# Patient Record
Sex: FEMALE | Race: WHITE | NOT HISPANIC OR LATINO | URBAN - METROPOLITAN AREA
[De-identification: names, ages, dates, MRNs, and addresses within clinical notes are randomized per-mention and may not be internally consistent; named-entity substitution may affect disease eponyms.]

---

## 2023-03-23 LAB
CHLAMYDIA TRACH., AMPLIFIED: NEGATIVE
N. GONORRHEA, AMPLIFIED: NEGATIVE

## 2023-04-04 ENCOUNTER — TELEPHONE (OUTPATIENT)
Dept: PRIMARY CARE | Facility: CLINIC | Age: 28
End: 2023-04-04
Payer: COMMERCIAL

## 2023-04-10 LAB
ALANINE AMINOTRANSFERASE (SGPT) (U/L) IN SER/PLAS: 12 U/L (ref 7–45)
ALBUMIN (G/DL) IN SER/PLAS: 4.3 G/DL (ref 3.4–5)
ALKALINE PHOSPHATASE (U/L) IN SER/PLAS: 59 U/L (ref 33–110)
ANION GAP IN SER/PLAS: 10 MMOL/L (ref 10–20)
ASPARTATE AMINOTRANSFERASE (SGOT) (U/L) IN SER/PLAS: 14 U/L (ref 9–39)
BILIRUBIN TOTAL (MG/DL) IN SER/PLAS: 0.8 MG/DL (ref 0–1.2)
CALCIUM (MG/DL) IN SER/PLAS: 9.4 MG/DL (ref 8.6–10.3)
CARBON DIOXIDE, TOTAL (MMOL/L) IN SER/PLAS: 30 MMOL/L (ref 21–32)
CHLORIDE (MMOL/L) IN SER/PLAS: 104 MMOL/L (ref 98–107)
CREATININE (MG/DL) IN SER/PLAS: 0.63 MG/DL (ref 0.5–1.05)
ERYTHROCYTE DISTRIBUTION WIDTH (RATIO) BY AUTOMATED COUNT: 12 % (ref 11.5–14.5)
ERYTHROCYTE MEAN CORPUSCULAR HEMOGLOBIN CONCENTRATION (G/DL) BY AUTOMATED: 33.5 G/DL (ref 32–36)
ERYTHROCYTE MEAN CORPUSCULAR VOLUME (FL) BY AUTOMATED COUNT: 98 FL (ref 80–100)
ERYTHROCYTES (10*6/UL) IN BLOOD BY AUTOMATED COUNT: 4.04 X10E12/L (ref 4–5.2)
GFR FEMALE: >90 ML/MIN/1.73M2
GLUCOSE (MG/DL) IN SER/PLAS: 75 MG/DL (ref 74–99)
HEMATOCRIT (%) IN BLOOD BY AUTOMATED COUNT: 39.4 % (ref 36–46)
HEMOGLOBIN (G/DL) IN BLOOD: 13.2 G/DL (ref 12–16)
LEUKOCYTES (10*3/UL) IN BLOOD BY AUTOMATED COUNT: 4.7 X10E9/L (ref 4.4–11.3)
PLATELETS (10*3/UL) IN BLOOD AUTOMATED COUNT: 329 X10E9/L (ref 150–450)
POTASSIUM (MMOL/L) IN SER/PLAS: 4 MMOL/L (ref 3.5–5.3)
PROTEIN TOTAL: 6.6 G/DL (ref 6.4–8.2)
SODIUM (MMOL/L) IN SER/PLAS: 140 MMOL/L (ref 136–145)
THYROTROPIN (MIU/L) IN SER/PLAS BY DETECTION LIMIT <= 0.05 MIU/L: 0.53 MIU/L (ref 0.44–3.98)
UREA NITROGEN (MG/DL) IN SER/PLAS: 9 MG/DL (ref 6–23)

## 2023-04-11 PROBLEM — L70.8 INFLAMMATORY ACNE: Status: ACTIVE | Noted: 2023-04-11

## 2023-04-11 PROBLEM — Z86.59 HISTORY OF DYSTHYMIC DISORDER: Status: ACTIVE | Noted: 2023-04-11

## 2023-04-11 PROBLEM — K60.2 RECTAL FISSURE: Status: ACTIVE | Noted: 2023-04-11

## 2023-04-11 PROBLEM — R63.4 ABNORMAL WEIGHT LOSS: Status: ACTIVE | Noted: 2023-04-11

## 2023-04-11 PROBLEM — F41.9 ANXIETY DISORDER: Status: ACTIVE | Noted: 2023-04-11

## 2023-04-11 PROBLEM — E83.52 HYPERCALCEMIA: Status: ACTIVE | Noted: 2023-04-11

## 2023-04-11 PROBLEM — R10.13 ABDOMINAL PAIN, EPIGASTRIC: Status: ACTIVE | Noted: 2023-04-11

## 2023-04-11 PROBLEM — R51.9 HEADACHE: Status: ACTIVE | Noted: 2023-04-11

## 2023-04-11 PROBLEM — K57.92 DIVERTICULITIS OF INTESTINE: Status: ACTIVE | Noted: 2023-04-11

## 2023-04-11 PROBLEM — K92.1 BLOODY STOOLS: Status: ACTIVE | Noted: 2023-04-11

## 2023-04-11 PROBLEM — R00.0 TACHYCARDIA: Status: ACTIVE | Noted: 2023-04-11

## 2023-04-11 PROBLEM — L65.9 HAIR LOSS: Status: ACTIVE | Noted: 2023-04-11

## 2023-04-11 PROBLEM — G44.209 MUSCLE TENSION HEADACHE: Status: ACTIVE | Noted: 2023-04-11

## 2023-04-11 PROBLEM — D50.9 IRON DEFICIENCY ANEMIA: Status: ACTIVE | Noted: 2023-04-11

## 2023-04-11 PROBLEM — F10.21 HISTORY OF ALCOHOL DEPENDENCE (MULTI): Status: ACTIVE | Noted: 2023-04-11

## 2023-04-11 PROBLEM — K62.5 RECTAL BLEEDING: Status: ACTIVE | Noted: 2023-04-11

## 2023-04-11 PROBLEM — F98.8 ATTENTION DEFICIT DISORDER OF CHILDHOOD: Status: ACTIVE | Noted: 2023-04-11

## 2023-04-11 PROBLEM — R00.2 PALPITATIONS: Status: ACTIVE | Noted: 2023-04-11

## 2023-04-11 PROBLEM — R07.89 CHEST DISCOMFORT: Status: ACTIVE | Noted: 2023-04-11

## 2023-04-11 PROBLEM — M62.89 PELVIC FLOOR DYSFUNCTION: Status: ACTIVE | Noted: 2023-04-11

## 2023-04-11 PROBLEM — K52.9 COLITIS: Status: ACTIVE | Noted: 2023-04-11

## 2023-04-11 PROBLEM — R11.0 NAUSEA IN ADULT: Status: ACTIVE | Noted: 2023-04-11

## 2023-04-11 PROBLEM — F32.0 DEPRESSION, MAJOR, SINGLE EPISODE, MILD (CMS-HCC): Status: ACTIVE | Noted: 2023-04-11

## 2023-04-11 PROBLEM — K29.70 GASTRITIS: Status: ACTIVE | Noted: 2023-04-11

## 2023-04-11 PROBLEM — K59.00 CONSTIPATION: Status: ACTIVE | Noted: 2023-04-11

## 2023-04-11 PROBLEM — J06.9 URI (UPPER RESPIRATORY INFECTION): Status: ACTIVE | Noted: 2023-04-11

## 2023-04-11 RX ORDER — ESCITALOPRAM OXALATE 10 MG/1
1 TABLET ORAL DAILY
COMMUNITY
Start: 2023-03-09

## 2023-04-11 RX ORDER — MINOCYCLINE HYDROCHLORIDE 100 MG/1
100 CAPSULE ORAL DAILY
COMMUNITY
End: 2023-09-25 | Stop reason: ALTCHOICE

## 2023-04-11 RX ORDER — BUSPIRONE HYDROCHLORIDE 10 MG/1
1 TABLET ORAL 2 TIMES DAILY
COMMUNITY
End: 2023-09-25 | Stop reason: DRUGHIGH

## 2023-04-11 RX ORDER — PANTOPRAZOLE SODIUM 40 MG/1
40 TABLET, DELAYED RELEASE ORAL DAILY
COMMUNITY
End: 2023-09-25 | Stop reason: WASHOUT

## 2023-04-11 RX ORDER — CYCLOBENZAPRINE HCL 5 MG
5 TABLET ORAL NIGHTLY
COMMUNITY
End: 2023-09-25 | Stop reason: ALTCHOICE

## 2023-04-12 ENCOUNTER — OFFICE VISIT (OUTPATIENT)
Dept: PRIMARY CARE | Facility: CLINIC | Age: 28
End: 2023-04-12
Payer: COMMERCIAL

## 2023-04-12 VITALS
TEMPERATURE: 98.1 F | RESPIRATION RATE: 16 BRPM | SYSTOLIC BLOOD PRESSURE: 108 MMHG | WEIGHT: 123 LBS | HEIGHT: 63 IN | BODY MASS INDEX: 21.79 KG/M2 | HEART RATE: 72 BPM | DIASTOLIC BLOOD PRESSURE: 62 MMHG

## 2023-04-12 DIAGNOSIS — L70.8 OTHER ACNE: ICD-10-CM

## 2023-04-12 DIAGNOSIS — K29.60 OTHER GASTRITIS WITHOUT HEMORRHAGE, UNSPECIFIED CHRONICITY: ICD-10-CM

## 2023-04-12 DIAGNOSIS — F32.0 DEPRESSION, MAJOR, SINGLE EPISODE, MILD (CMS-HCC): ICD-10-CM

## 2023-04-12 DIAGNOSIS — G44.89 OTHER HEADACHE SYNDROME: Primary | ICD-10-CM

## 2023-04-12 PROCEDURE — 99214 OFFICE O/P EST MOD 30 MIN: CPT | Performed by: INTERNAL MEDICINE

## 2023-04-12 RX ORDER — ATENOLOL 25 MG/1
25 TABLET ORAL DAILY
Qty: 30 TABLET | Refills: 5 | Status: SHIPPED | OUTPATIENT
Start: 2023-04-12 | End: 2023-09-25 | Stop reason: WASHOUT

## 2023-04-12 ASSESSMENT — ENCOUNTER SYMPTOMS
PALPITATIONS: 0
CHILLS: 0
NECK STIFFNESS: 1
WHEEZING: 0
CARDIOVASCULAR NEGATIVE: 1
JOINT SWELLING: 0
CONFUSION: 0
CHEST TIGHTNESS: 0
VOMITING: 0
COUGH: 0
ADENOPATHY: 0
DIARRHEA: 0
WEAKNESS: 0
DYSURIA: 0
CONSTIPATION: 0
NAUSEA: 0
SHORTNESS OF BREATH: 0
ABDOMINAL PAIN: 0
SORE THROAT: 0
FATIGUE: 1
RESPIRATORY NEGATIVE: 1
NECK PAIN: 1
DIZZINESS: 0
UNEXPECTED WEIGHT CHANGE: 0
SLEEP DISTURBANCE: 0
ARTHRALGIAS: 0
HEADACHES: 1

## 2023-04-12 ASSESSMENT — PATIENT HEALTH QUESTIONNAIRE - PHQ9
SUM OF ALL RESPONSES TO PHQ9 QUESTIONS 1 AND 2: 0
2. FEELING DOWN, DEPRESSED OR HOPELESS: NOT AT ALL
1. LITTLE INTEREST OR PLEASURE IN DOING THINGS: NOT AT ALL

## 2023-04-12 NOTE — PROGRESS NOTES
"Subjective   Patient ID: Maritza Macedo is a 28 y.o. female who presents for Follow-up (Follow up  on headaches, stomach ache, fatigue, eye problems/Labs -4.10.23 by dr Sharon CRUM).    Patient here today to check symptoms- headaches  on/off stomach ache on/off, acne, fatigue, neck stiffness x 2 1/2 months, patient states she is eating healthy and exercises, stays hydrated , but no help with the symptoms   Labs ordered by dr Glenna FRANKS done 4.10.23, sha had a video ryder same day , muscle relaxant rx prescribed , a stomach med  and med for acnee     Has constant  frontal headache, on/off, seen by eye dr, has glasses when working on the computer, has it for 2 month, and getting worse, takes ibuprofen or tylenol, 2 pills once or twice /day. Has stomach pain with every meals, or drinking water, .  Labs and test reviewed, discussed with patient plan       Review of Systems   Constitutional:  Positive for fatigue. Negative for chills and unexpected weight change.        Comment   HENT:  Negative for congestion, ear pain and sore throat.    Respiratory: Negative.  Negative for cough, chest tightness, shortness of breath and wheezing.    Cardiovascular: Negative.  Negative for palpitations and leg swelling.   Gastrointestinal:  Negative for abdominal pain, constipation, diarrhea, nausea and vomiting.   Genitourinary:  Negative for dysuria and urgency.   Musculoskeletal:  Positive for neck pain and neck stiffness. Negative for arthralgias and joint swelling.   Skin:  Negative for rash.   Neurological:  Positive for headaches. Negative for dizziness and weakness.   Hematological:  Negative for adenopathy.   Psychiatric/Behavioral:  Negative for confusion and sleep disturbance.        Objective   /62 (BP Location: Left arm, Patient Position: Sitting)   Pulse 72   Temp 36.7 °C (98.1 °F)   Resp 16   Ht 1.6 m (5' 3\")   Wt 55.8 kg (123 lb)   BMI 21.79 kg/m²     Physical Exam  Constitutional:       Appearance: Normal " appearance.   HENT:      Head: Normocephalic and atraumatic.   Eyes:      Conjunctiva/sclera: Conjunctivae normal.   Neck:      Vascular: No carotid bruit.   Cardiovascular:      Rate and Rhythm: Normal rate and regular rhythm.      Heart sounds: No murmur heard.  Pulmonary:      Effort: No respiratory distress.      Breath sounds: No wheezing, rhonchi or rales.   Chest:      Chest wall: No tenderness.   Abdominal:      General: Bowel sounds are normal. There is no distension.      Palpations: Abdomen is soft. There is no mass.      Tenderness: There is no abdominal tenderness.   Musculoskeletal:      Cervical back: Neck supple.   Lymphadenopathy:      Cervical: No cervical adenopathy.   Skin:     Coloration: Skin is not jaundiced.      Findings: No rash.   Neurological:      General: No focal deficit present.      Mental Status: She is alert and oriented to person, place, and time. Mental status is at baseline.      Motor: No weakness.      Gait: Gait normal.   Psychiatric:         Mood and Affect: Mood normal.         Behavior: Behavior normal.         Judgment: Judgment normal.         Assessment/Plan

## 2023-04-12 NOTE — ASSESSMENT & PLAN NOTE
Had egd and colonoscopy years ago, all have been ok, was drinking at the time, symptoms gone after quiting drinking

## 2023-04-20 ENCOUNTER — APPOINTMENT (OUTPATIENT)
Dept: PRIMARY CARE | Facility: CLINIC | Age: 28
End: 2023-04-20
Payer: COMMERCIAL

## 2023-06-14 ENCOUNTER — APPOINTMENT (OUTPATIENT)
Dept: PRIMARY CARE | Facility: CLINIC | Age: 28
End: 2023-06-14
Payer: COMMERCIAL

## 2023-09-07 PROBLEM — R53.83 FATIGUE: Status: ACTIVE | Noted: 2023-09-07

## 2023-09-07 PROBLEM — D64.9 ANEMIA: Status: ACTIVE | Noted: 2023-09-07

## 2023-09-07 RX ORDER — ULIPRISTAL ACETATE 30 MG/1
TABLET ORAL
COMMUNITY
Start: 2023-06-12 | End: 2023-09-25 | Stop reason: WASHOUT

## 2023-09-25 ENCOUNTER — OFFICE VISIT (OUTPATIENT)
Dept: PRIMARY CARE | Facility: CLINIC | Age: 28
End: 2023-09-25
Payer: COMMERCIAL

## 2023-09-25 VITALS
BODY MASS INDEX: 21.93 KG/M2 | HEIGHT: 63 IN | WEIGHT: 123.8 LBS | SYSTOLIC BLOOD PRESSURE: 108 MMHG | DIASTOLIC BLOOD PRESSURE: 60 MMHG | HEART RATE: 72 BPM | TEMPERATURE: 97.3 F | RESPIRATION RATE: 16 BRPM

## 2023-09-25 DIAGNOSIS — M54.50 CHRONIC LEFT-SIDED LOW BACK PAIN WITHOUT SCIATICA: ICD-10-CM

## 2023-09-25 DIAGNOSIS — N94.6 DYSMENORRHEA: ICD-10-CM

## 2023-09-25 DIAGNOSIS — F32.0 DEPRESSION, MAJOR, SINGLE EPISODE, MILD (CMS-HCC): ICD-10-CM

## 2023-09-25 DIAGNOSIS — Z00.00 ANNUAL PHYSICAL EXAM: Primary | ICD-10-CM

## 2023-09-25 DIAGNOSIS — Z23 NEED FOR VACCINATION: ICD-10-CM

## 2023-09-25 DIAGNOSIS — G89.29 CHRONIC LEFT-SIDED LOW BACK PAIN WITHOUT SCIATICA: ICD-10-CM

## 2023-09-25 PROCEDURE — 99213 OFFICE O/P EST LOW 20 MIN: CPT | Performed by: INTERNAL MEDICINE

## 2023-09-25 PROCEDURE — 90686 IIV4 VACC NO PRSV 0.5 ML IM: CPT | Performed by: INTERNAL MEDICINE

## 2023-09-25 PROCEDURE — 99395 PREV VISIT EST AGE 18-39: CPT | Performed by: INTERNAL MEDICINE

## 2023-09-25 PROCEDURE — 90471 IMMUNIZATION ADMIN: CPT | Performed by: INTERNAL MEDICINE

## 2023-09-25 RX ORDER — DICLOFENAC SODIUM 10 MG/G
4 GEL TOPICAL 4 TIMES DAILY
Qty: 60 G | Refills: 3 | Status: SHIPPED | OUTPATIENT
Start: 2023-09-25 | End: 2023-09-27

## 2023-09-25 RX ORDER — BUSPIRONE HYDROCHLORIDE 10 MG/1
15 TABLET ORAL 2 TIMES DAILY
Qty: 45 TABLET | Refills: 0 | Status: SHIPPED | OUTPATIENT
Start: 2023-09-25

## 2023-09-25 RX ORDER — CYANOCOBALAMIN (VITAMIN B-12) 1000MCG/ML
DROPS ORAL
COMMUNITY

## 2023-09-25 RX ORDER — ULIPRISTAL ACETATE 30 MG/1
30 TABLET ORAL ONCE
Qty: 1 TABLET | Refills: 0 | Status: SHIPPED | OUTPATIENT
Start: 2023-09-25 | End: 2023-09-25

## 2023-09-25 ASSESSMENT — ENCOUNTER SYMPTOMS
ARTHRALGIAS: 0
CONSTIPATION: 0
DYSURIA: 0
COUGH: 0
ADENOPATHY: 0
NAUSEA: 0
PALPITATIONS: 0
VOMITING: 0
DIZZINESS: 0
SHORTNESS OF BREATH: 0
SLEEP DISTURBANCE: 0
DIARRHEA: 0
UNEXPECTED WEIGHT CHANGE: 0
WHEEZING: 0
BACK PAIN: 1
ABDOMINAL PAIN: 0
FATIGUE: 1
CHILLS: 0
SORE THROAT: 0
CONFUSION: 0
WEAKNESS: 0
HEADACHES: 1
CHEST TIGHTNESS: 0
JOINT SWELLING: 0

## 2023-09-25 ASSESSMENT — PATIENT HEALTH QUESTIONNAIRE - PHQ9
2. FEELING DOWN, DEPRESSED OR HOPELESS: NOT AT ALL
1. LITTLE INTEREST OR PLEASURE IN DOING THINGS: NOT AT ALL
SUM OF ALL RESPONSES TO PHQ9 QUESTIONS 1 AND 2: 0

## 2023-09-25 NOTE — PATIENT INSTRUCTIONS
Was nice seeing you today.  Continue same medication.  Have lab work done before next appointment if labs were ordered today.  Fu prn  Call/ contact our office with any concerns.

## 2023-09-25 NOTE — PROGRESS NOTES
Subjective   Maritza Macedo is a 28 y.o. female who presents for Annual Exam (CPE).  CPE  PAP-10.10.22, has IUD  takebout and all her headaches and symptoms  are gone.  Colonoscopy-12.18.2019 and endoscopy,to repeat colonoscopy  @ 46 y old , all normal, had some bleeding hemorrhoids.  Patient stopped taking atenolol per her choice after taking it for 2 months,  was started due to headache   Patient had a fall in march 2023, ice skating , since that time  L lower back area pain constant , getting worse.  Has some headache around period, to see endo soon.  Flu vaccine  today.      Review of Systems   Constitutional:  Positive for fatigue. Negative for chills and unexpected weight change.        Comment   HENT:  Negative for congestion, ear pain and sore throat.    Respiratory:  Negative for cough, chest tightness, shortness of breath and wheezing.    Cardiovascular:  Negative for palpitations and leg swelling.   Gastrointestinal:  Negative for abdominal pain, constipation, diarrhea, nausea and vomiting.   Genitourinary:  Negative for dysuria and urgency.   Musculoskeletal:  Positive for back pain. Negative for arthralgias and joint swelling.   Skin:  Negative for rash.   Neurological:  Positive for headaches. Negative for dizziness and weakness.   Hematological:  Negative for adenopathy.   Psychiatric/Behavioral:  Negative for confusion and sleep disturbance.         Anxiety   All other systems reviewed and are negative.      Objective   Physical Exam  Constitutional:       Appearance: Normal appearance.   HENT:      Head: Normocephalic and atraumatic.   Eyes:      Conjunctiva/sclera: Conjunctivae normal.   Neck:      Vascular: No carotid bruit.   Cardiovascular:      Rate and Rhythm: Normal rate and regular rhythm.      Heart sounds: No murmur heard.  Pulmonary:      Effort: No respiratory distress.      Breath sounds: No wheezing, rhonchi or rales.   Chest:      Chest wall: No tenderness.   Abdominal:      General:  "Bowel sounds are normal. There is no distension.      Palpations: Abdomen is soft. There is no mass.      Tenderness: There is no abdominal tenderness.   Musculoskeletal:         General: Normal range of motion.      Cervical back: Neck supple.   Lymphadenopathy:      Cervical: No cervical adenopathy.   Skin:     Coloration: Skin is not jaundiced.      Findings: No rash.   Neurological:      General: No focal deficit present.      Mental Status: She is alert and oriented to person, place, and time. Mental status is at baseline.      Motor: No weakness.      Gait: Gait normal.   Psychiatric:         Mood and Affect: Mood normal.         Behavior: Behavior normal.         Judgment: Judgment normal.       /60 (BP Location: Left arm, Patient Position: Sitting)   Pulse 72   Temp 36.3 °C (97.3 °F)   Resp 16   Ht 1.6 m (5' 3\")   Wt 56.2 kg (123 lb 12.8 oz)   BMI 21.93 kg/m²       Assessment/Plan   Problem List Items Addressed This Visit       Depression, major, single episode, mild (CMS/HCC)     Has pshych fu         Dysmenorrhea     To see endo         Relevant Medications    ulipristal (Inocencia) 30 mg tablet    Annual physical exam - Primary    Chronic left-sided low back pain without sciatica     Since march , after a fall, midline left sacral area, constant now  X ray, voltaren cream prn           Relevant Medications    diclofenac sodium (Voltaren) 1 % gel gel    Other Relevant Orders    XR lumbar spine 2-3 views     Other Visit Diagnoses       Need for vaccination        Relevant Orders    Flu vaccine (IIV4) age 6 months and greater, preservative free            "

## 2023-09-27 RX ORDER — DICLOFENAC SODIUM 10 MG/G
4 GEL TOPICAL 4 TIMES DAILY
Qty: 100 G | Refills: 3 | Status: SHIPPED | OUTPATIENT
Start: 2023-09-27 | End: 2023-10-11

## 2023-10-06 NOTE — PROGRESS NOTES
"Patient is seen at the request of Rosalinda Mendes for my opinion regarding fatigue. My final recommendations will be communicated back to the requesting provider by way of shared medical record.    Subjective   Maritza Macedo is a 28 y.o. female with a hx of depression, who presents for evaluation of fatigue.    Referred by gynecologist who she saw in July for discussion of contraception.  She had an IUD (Mirena) removed in June due to adverse effects.  Most symptoms improved significantly after removal of IUD but continued to have mood changes, irritability, headaches, and acne.  Has been on COCs and depo injections in the past.  Currently not on any medications.    Symptoms that bother her the most are headaches and mood swings.  Headaches are new (started while on Mirena and persisting now).  Has palpitations.    Menarche: 14 yo  Periods: regular prior to being COCs in high school  Acne: yes (face,chest, back)  Hirsutism: no  Hair loss: yes    Labs from 04/2023  TSH 0.53    ROS  General: pleasant, not in acute distress  Cardiology: +occasional palpitations  Neuro: +headaches  Psychiatry: + mood swings, + irritability    Objective    Blood pressure 103/65, pulse 66, height 1.6 m (5' 3\"), weight 56.7 kg (125 lb).  Physical Exam  Constitutional:       General: She is not in acute distress.     Appearance: Normal appearance.   Eyes:      Extraocular Movements: Extraocular movements intact.   Neck:      Thyroid: No thyromegaly.   Musculoskeletal:      Right lower leg: No edema.      Left lower leg: No edema.   Skin:     General: Skin is warm.      Comments: -hirsutism, + acne on face, back, and chest   Neurological:      Mental Status: She is alert and oriented to person, place, and time.   Psychiatric:         Mood and Affect: Mood normal.       Current Outpatient Medications:     B complex-vitamin C-folic acid (Nephro-Alan Rx) 1- mg-mg-mcg tablet, Take 1 tablet by mouth once daily with a meal. 1000mcg, Disp: , " Rfl:     busPIRone (Buspar) 10 mg tablet, Take 1.5 tablets (15 mg) by mouth 2 times a day., Disp: 45 tablet, Rfl: 0    escitalopram (Lexapro) 10 mg tablet, Take 1 tablet (10 mg) by mouth once daily., Disp: , Rfl:     melatonin 1 mg tablet, sublingual, Place under the tongue. PRN, Disp: , Rfl:     Assessment/Plan   Maritza Macedo is a 28 y.o. female with a hx of depression/anxiety who presents for evaluation of headaches/mood swings/acne/hair loss.    Acne  2.   Hair loss  3.   Irritability/mood swings    Symptoms of headache, mood changes, irritability, and hair loss have persisted despite removal of IUD in June.  Periods are regular.  Cystic acne on face, chest, and back which has worsened since having the Mirena placed.  Has had acne on her face since menarche. Currently seeing a dermatologist for this.    Will evaluate for PCOS given the hyperandrogen symptoms. BMI 22.  However, headaches and mood swings/irritability are not common symptoms of PCOS. Thyroid function was normal in April 2023.  If hormonal evaluation is normal, she may benefit from evaluation of premenstrual dysphoric syndrome. She has been tracking her periods and symptoms. She is very distressed with the mood changes/irritability but she cannot control them.    PLAN:  -check AM testosterone, 17OHP, CMP, insulin, HbA1c, TSH, FT4    Follow up TBD

## 2023-10-11 ENCOUNTER — OFFICE VISIT (OUTPATIENT)
Dept: ENDOCRINOLOGY | Facility: CLINIC | Age: 28
End: 2023-10-11
Payer: COMMERCIAL

## 2023-10-11 VITALS
HEIGHT: 63 IN | WEIGHT: 125 LBS | DIASTOLIC BLOOD PRESSURE: 65 MMHG | HEART RATE: 66 BPM | SYSTOLIC BLOOD PRESSURE: 103 MMHG | BODY MASS INDEX: 22.15 KG/M2

## 2023-10-11 DIAGNOSIS — L65.9 HAIR LOSS: ICD-10-CM

## 2023-10-11 DIAGNOSIS — L70.8 OTHER ACNE: Primary | ICD-10-CM

## 2023-10-11 PROCEDURE — 99204 OFFICE O/P NEW MOD 45 MIN: CPT | Performed by: STUDENT IN AN ORGANIZED HEALTH CARE EDUCATION/TRAINING PROGRAM

## 2023-10-11 ASSESSMENT — PAIN SCALES - GENERAL: PAINLEVEL: 0-NO PAIN

## 2023-10-12 ENCOUNTER — LAB (OUTPATIENT)
Dept: LAB | Facility: LAB | Age: 28
End: 2023-10-12
Payer: COMMERCIAL

## 2023-10-12 DIAGNOSIS — L70.8 OTHER ACNE: ICD-10-CM

## 2023-10-12 DIAGNOSIS — L65.9 HAIR LOSS: ICD-10-CM

## 2023-10-12 LAB
ALBUMIN SERPL BCP-MCNC: 4.4 G/DL (ref 3.4–5)
ALP SERPL-CCNC: 70 U/L (ref 33–110)
ALT SERPL W P-5'-P-CCNC: 20 U/L (ref 7–45)
ANION GAP SERPL CALC-SCNC: 13 MMOL/L (ref 10–20)
AST SERPL W P-5'-P-CCNC: 20 U/L (ref 9–39)
BILIRUB SERPL-MCNC: 0.6 MG/DL (ref 0–1.2)
BUN SERPL-MCNC: 7 MG/DL (ref 6–23)
CALCIUM SERPL-MCNC: 9.6 MG/DL (ref 8.6–10.3)
CHLORIDE SERPL-SCNC: 106 MMOL/L (ref 98–107)
CO2 SERPL-SCNC: 28 MMOL/L (ref 21–32)
CREAT SERPL-MCNC: 0.67 MG/DL (ref 0.5–1.05)
EST. AVERAGE GLUCOSE BLD GHB EST-MCNC: 82 MG/DL
GFR SERPL CREATININE-BSD FRML MDRD: >90 ML/MIN/1.73M*2
GLUCOSE SERPL-MCNC: 55 MG/DL (ref 74–99)
HBA1C MFR BLD: 4.5 %
INSULIN SERPL-ACNC: 57 UIU/ML (ref 3–25)
POTASSIUM SERPL-SCNC: 3.9 MMOL/L (ref 3.5–5.3)
PROT SERPL-MCNC: 7.3 G/DL (ref 6.4–8.2)
SODIUM SERPL-SCNC: 143 MMOL/L (ref 136–145)
T4 FREE SERPL-MCNC: 0.84 NG/DL (ref 0.61–1.12)
TSH SERPL-ACNC: 0.65 MIU/L (ref 0.44–3.98)

## 2023-10-12 PROCEDURE — 83498 ASY HYDROXYPROGESTERONE 17-D: CPT

## 2023-10-12 PROCEDURE — 84439 ASSAY OF FREE THYROXINE: CPT

## 2023-10-12 PROCEDURE — 36415 COLL VENOUS BLD VENIPUNCTURE: CPT

## 2023-10-12 PROCEDURE — 83036 HEMOGLOBIN GLYCOSYLATED A1C: CPT

## 2023-10-12 PROCEDURE — 84443 ASSAY THYROID STIM HORMONE: CPT

## 2023-10-12 PROCEDURE — 83525 ASSAY OF INSULIN: CPT

## 2023-10-12 PROCEDURE — 80053 COMPREHEN METABOLIC PANEL: CPT

## 2023-10-12 PROCEDURE — 84402 ASSAY OF FREE TESTOSTERONE: CPT

## 2023-10-16 LAB — 17OHP SERPL-MCNC: 24.14 NG/DL

## 2023-10-17 LAB
TESTOSTERONE FREE (CHAN): 1.4 PG/ML (ref 0.1–6.4)
TESTOSTERONE,TOTAL,LC-MS/MS: 19 NG/DL (ref 2–45)

## 2023-10-18 ENCOUNTER — TELEPHONE (OUTPATIENT)
Dept: ENDOCRINOLOGY | Facility: CLINIC | Age: 28
End: 2023-10-18
Payer: COMMERCIAL

## 2023-10-18 NOTE — TELEPHONE ENCOUNTER
Called patient and discussed lab results.  Euthyroid.  17OHP normal.  HbA1c 4.5%.  Elevated insulin (57) when BG 55 (1 hour post peal).  Normal testosterone.    She has acne and hair loss. Periods are currently regular.  Labs demonstrate insulin resistance. No overt symptoms of hypoglycemia (she reports that lightheadness/dizziness come and go which has not changed since IUD placement) when she had the labs done. She had a croissant and coffee about an hour before the labs.   No transvaginal ultrasound.  I do not think PCOS can be ruled out at this time.     She also has significant mood swings/irritability that has been difficult to control.  She sees a psychiatrist for depression and has an appointment next week. Advised that she discuss her symptoms as she may have premenstrual dysphoric syndrome (PMDD)  and adjustments to her medications may help.  COCs also help with PMDD if she is willing to try COCs again. She has issues with spotting in the past on COCs if she did not take the SARA at exactly the same time every day. Advised that she discuss with her gynecologist regarding different doses of COCs. Also discussed that COCs are first choice treatment for PCOS as well.    For her insulin resistance, we discussed pharmacotherapy (metformin) and dietary adjustments.  Recommended avoiding large carbohydrate loads in one sitting, pairing proteins with carbs, choosing complex carbs over simple carbs to reduce post-prandial surges.  Regular exercise also helps to improve insulin resistance.     At this time, she will consider COCs and the recommended dietary changes.  Given that these recommendations would not change whether or not she has a diagnosis of PCOS, will forgo transvaginal US for now.  The patient asked very appropriate questions and verbalized understanding.

## 2024-02-05 ENCOUNTER — LAB (OUTPATIENT)
Dept: LAB | Facility: LAB | Age: 29
End: 2024-02-05
Payer: COMMERCIAL

## 2024-02-05 ENCOUNTER — OFFICE VISIT (OUTPATIENT)
Dept: OBSTETRICS AND GYNECOLOGY | Facility: CLINIC | Age: 29
End: 2024-02-05
Payer: COMMERCIAL

## 2024-02-05 VITALS
DIASTOLIC BLOOD PRESSURE: 78 MMHG | HEART RATE: 73 BPM | BODY MASS INDEX: 21.65 KG/M2 | WEIGHT: 122.2 LBS | SYSTOLIC BLOOD PRESSURE: 122 MMHG | HEIGHT: 63 IN

## 2024-02-05 DIAGNOSIS — R51.9 CHRONIC NONINTRACTABLE HEADACHE, UNSPECIFIED HEADACHE TYPE: ICD-10-CM

## 2024-02-05 DIAGNOSIS — L70.0 ACNE VULGARIS: ICD-10-CM

## 2024-02-05 DIAGNOSIS — F32.81 PMDD (PREMENSTRUAL DYSPHORIC DISORDER): Primary | ICD-10-CM

## 2024-02-05 DIAGNOSIS — E28.2 PCOS (POLYCYSTIC OVARIAN SYNDROME): ICD-10-CM

## 2024-02-05 DIAGNOSIS — G89.29 CHRONIC NONINTRACTABLE HEADACHE, UNSPECIFIED HEADACHE TYPE: ICD-10-CM

## 2024-02-05 PROBLEM — R00.2 PALPITATIONS: Status: RESOLVED | Noted: 2023-04-11 | Resolved: 2024-02-05

## 2024-02-05 PROBLEM — F32.A ANXIETY AND DEPRESSION: Status: ACTIVE | Noted: 2023-04-11

## 2024-02-05 PROBLEM — L70.8 INFLAMMATORY ACNE: Status: RESOLVED | Noted: 2023-04-11 | Resolved: 2024-02-05

## 2024-02-05 PROBLEM — K60.2 RECTAL FISSURE: Status: RESOLVED | Noted: 2023-04-11 | Resolved: 2024-02-05

## 2024-02-05 PROBLEM — K57.92 DIVERTICULITIS OF INTESTINE: Status: RESOLVED | Noted: 2023-04-11 | Resolved: 2024-02-05

## 2024-02-05 PROBLEM — M62.89 PELVIC FLOOR DYSFUNCTION: Status: RESOLVED | Noted: 2023-04-11 | Resolved: 2024-02-05

## 2024-02-05 PROBLEM — M54.50 CHRONIC LEFT-SIDED LOW BACK PAIN WITHOUT SCIATICA: Status: RESOLVED | Noted: 2023-09-25 | Resolved: 2024-02-05

## 2024-02-05 PROBLEM — Z86.59 HISTORY OF DYSTHYMIC DISORDER: Status: RESOLVED | Noted: 2023-04-11 | Resolved: 2024-02-05

## 2024-02-05 LAB — DHEA-S SERPL-MCNC: 125 UG/DL (ref 65–395)

## 2024-02-05 PROCEDURE — 99214 OFFICE O/P EST MOD 30 MIN: CPT | Mod: TH | Performed by: OBSTETRICS & GYNECOLOGY

## 2024-02-05 PROCEDURE — 99214 OFFICE O/P EST MOD 30 MIN: CPT | Performed by: OBSTETRICS & GYNECOLOGY

## 2024-02-05 PROCEDURE — 36415 COLL VENOUS BLD VENIPUNCTURE: CPT

## 2024-02-05 PROCEDURE — 82627 DEHYDROEPIANDROSTERONE: CPT

## 2024-02-05 ASSESSMENT — ENCOUNTER SYMPTOMS
CARDIOVASCULAR NEGATIVE: 0
ENDOCRINE NEGATIVE: 0
NEUROLOGICAL NEGATIVE: 0
EYES NEGATIVE: 0
MUSCULOSKELETAL NEGATIVE: 0
HEMATOLOGIC/LYMPHATIC NEGATIVE: 0
PSYCHIATRIC NEGATIVE: 0
RESPIRATORY NEGATIVE: 0
ALLERGIC/IMMUNOLOGIC NEGATIVE: 0
CONSTITUTIONAL NEGATIVE: 0
GASTROINTESTINAL NEGATIVE: 0

## 2024-02-05 ASSESSMENT — PAIN SCALES - GENERAL: PAINLEVEL: 0-NO PAIN

## 2024-02-05 NOTE — PROGRESS NOTES
Assessment   Likely PCOS - mild insulin resistance, significant acne, irregular cycles that can be >35 days in interval.    Discussed treatment options:  - Trial of nortriptyline for headache and PMDD   - Trial of a different progestin OCP with low EE for irregular periods, acne, PMDD  Pt would prefer the OCP trial first.     Menstrual and symptom tracking recommended.  Also check DHEA-S for acne, as testosterone was normal.   Consider use of daily Zrytec for what might be stress-induced urticaria.    FU with me in 3 months.    Subjective   29 YO G0 here to discuss birth control options. Cycles are regular. Acne is bad (nl testosterone Oct 2023). HA is all over the place. Emotions are all over the place, not just before her periods or during her periods.     BC Trials:  Depo: stopped just bc of concerns over future low BMD, took x 6 months, doesn't recall any AE  Tamera: had issues with acne and breakthrough bleeding if even a few hours late  Mirena (Oct 2022 - July 2023): feels like she started getting AE after 6 months of use (new onset headache, fatigue, nausea, hair thinning, dizziness, worsening acne). Some sx improved significantly and immediately after removal but was still having issues with mood changes, irritability, headaches, and acne.   Notes deep itching within her body for the past week, no rash, varies from palms to thighs and shoulders. Did reduce with Benadryl when really bad.     CORTEZ:  April 2023 - CBC, CMP, TSH results from April 2023   Oct 2023 - neg PCO labs with just mild insulin resistance   No prior pelvic US.    Menarche: 14 yo  Menses: q4-6 weeks lasting 5 days each, not too heavy  Acne: yes (face,chest, back)  Hirsutism: no  Hair loss: yes  Sexual Activity: condom and Natural Cycle with partner. On period today.    Medical Problems       Problem List       Anxiety and depression    Overview Addendum 2/5/2024  1:04 PM by Mya Watts MD     Psychiatrist =   On Lexapro 10mg daily + Buspar 15mg  "BID. No longer on Wellbutrin.  Low libido          Attention deficit disorder of childhood    History of alcohol dependence (CMS/HCC)    Overview Addendum 2/5/2024  1:08 PM by Mya Watts MD     Hx heavy, daily use and binge drinking  Saw psychologist starting in 2020             Objective   /78   Pulse 73   Ht 1.6 m (5' 3\")   Wt 55.4 kg (122 lb 3.2 oz)   LMP 02/05/2024   BMI 21.65 kg/m²      General:   Alert and oriented, in no acute distress   Neck: Supple. No visible thyromegaly.    Psych Normal affect. Normal mood.          "

## 2024-02-14 DIAGNOSIS — E28.2 PCOS (POLYCYSTIC OVARIAN SYNDROME): Primary | ICD-10-CM

## 2024-02-19 ENCOUNTER — HOSPITAL ENCOUNTER (OUTPATIENT)
Dept: RADIOLOGY | Facility: CLINIC | Age: 29
Discharge: HOME | End: 2024-02-19
Payer: COMMERCIAL

## 2024-02-19 DIAGNOSIS — E28.2 PCOS (POLYCYSTIC OVARIAN SYNDROME): ICD-10-CM

## 2024-02-19 PROCEDURE — 76830 TRANSVAGINAL US NON-OB: CPT | Performed by: RADIOLOGY

## 2024-02-19 PROCEDURE — 76856 US EXAM PELVIC COMPLETE: CPT

## 2024-02-19 PROCEDURE — 76856 US EXAM PELVIC COMPLETE: CPT | Performed by: RADIOLOGY

## 2024-02-27 ENCOUNTER — APPOINTMENT (OUTPATIENT)
Dept: INTEGRATIVE MEDICINE | Facility: CLINIC | Age: 29
End: 2024-02-27
Payer: COMMERCIAL

## 2024-04-28 DIAGNOSIS — F32.81 PMDD (PREMENSTRUAL DYSPHORIC DISORDER): ICD-10-CM

## 2024-04-28 DIAGNOSIS — L70.0 ACNE VULGARIS: ICD-10-CM

## 2024-05-06 ENCOUNTER — APPOINTMENT (OUTPATIENT)
Dept: OBSTETRICS AND GYNECOLOGY | Facility: CLINIC | Age: 29
End: 2024-05-06
Payer: COMMERCIAL

## 2024-08-21 ENCOUNTER — OFFICE VISIT (OUTPATIENT)
Dept: OBSTETRICS AND GYNECOLOGY | Facility: CLINIC | Age: 29
End: 2024-08-21
Payer: MEDICARE

## 2024-08-21 VITALS
DIASTOLIC BLOOD PRESSURE: 80 MMHG | SYSTOLIC BLOOD PRESSURE: 114 MMHG | HEART RATE: 67 BPM | BODY MASS INDEX: 22.32 KG/M2 | HEIGHT: 63 IN | WEIGHT: 126 LBS

## 2024-08-21 DIAGNOSIS — F41.9 ANXIETY AND DEPRESSION: ICD-10-CM

## 2024-08-21 DIAGNOSIS — E28.2 PCOS (POLYCYSTIC OVARIAN SYNDROME): ICD-10-CM

## 2024-08-21 DIAGNOSIS — F32.81 PMDD (PREMENSTRUAL DYSPHORIC DISORDER): Primary | ICD-10-CM

## 2024-08-21 DIAGNOSIS — F32.A ANXIETY AND DEPRESSION: ICD-10-CM

## 2024-08-21 PROCEDURE — 99214 OFFICE O/P EST MOD 30 MIN: CPT | Mod: TH | Performed by: OBSTETRICS & GYNECOLOGY

## 2024-08-21 PROCEDURE — 99214 OFFICE O/P EST MOD 30 MIN: CPT | Performed by: OBSTETRICS & GYNECOLOGY

## 2024-08-21 PROCEDURE — 1036F TOBACCO NON-USER: CPT | Performed by: OBSTETRICS & GYNECOLOGY

## 2024-08-21 PROCEDURE — 3008F BODY MASS INDEX DOCD: CPT | Performed by: OBSTETRICS & GYNECOLOGY

## 2024-08-21 RX ORDER — ESCITALOPRAM OXALATE 20 MG/1
20 TABLET ORAL DAILY
Qty: 30 TABLET | Refills: 0 | Status: SHIPPED | OUTPATIENT
Start: 2024-08-21 | End: 2024-09-20

## 2024-08-21 ASSESSMENT — ENCOUNTER SYMPTOMS
ALLERGIC/IMMUNOLOGIC NEGATIVE: 0
PSYCHIATRIC NEGATIVE: 1
GASTROINTESTINAL NEGATIVE: 0
CARDIOVASCULAR NEGATIVE: 0
CONSTITUTIONAL NEGATIVE: 0
RESPIRATORY NEGATIVE: 0
HEMATOLOGIC/LYMPHATIC NEGATIVE: 0
ENDOCRINE NEGATIVE: 0
EYES NEGATIVE: 0
NEUROLOGICAL NEGATIVE: 0
MUSCULOSKELETAL NEGATIVE: 0

## 2024-08-21 ASSESSMENT — PAIN SCALES - GENERAL: PAINLEVEL: 0-NO PAIN

## 2024-08-21 NOTE — PROGRESS NOTES
Assessment  Likely PCOS - mild insulin resistance, significant acne, irregular cycles that can be >35 days in interval.     Discussed treatment options:  - Cont Junel-24 Fe  - Incr Lexapro to 20mg daily and fu with psychiatrist     Menstrual and symptom tracking recommended.  FU Nov 2024.    Mya Watts MD     Subjective    28 YO G0 here to fu birth control options in the context of chronic emotional lability (not just premenstrual), PCOS (mild insulin resistance, significant acne, irregular cycles that can be >35 days in interval), and urticaria.    Interval HX:  Started new OCP in Feb 2024 and it seemed to be going well initially.  However, as times goes on, she feels like her sx are coming back.    On Junel 24 1/20.  Not a lot of BTB, just this past month had about 10 days of brown-black bleeding before her placebo days.  Not tracking per se but feels that she gets very emotional a few days before placebo.   She'll get to placebo pills and doesn't get period until until day 3 of placebo.  She feels like she continues to have sx until she is fully done with her period.     She never took the nortriptyline as her HA improved with cutting out sugar.   Pruritus has improved as well spontaneously. Did not need Zrytec.    Hasn't see her old psychiatrist bc she has been in [facility], and may be able to schedule mid-October.     BC Trials:  Depo: stopped just bc of concerns over future low BMD, took x 6 months, doesn't recall any AE  Tamera: had issues with acne and breakthrough bleeding if even a few hours late  Mirena (Oct 2022 - July 2023): feels like she started getting AE after 6 months of use (new onset headache, fatigue, nausea, hair thinning, dizziness, worsening acne). Some sx improved significantly and immediately after removal but was still having issues with mood changes, irritability, headaches, and acne.   Junel 24 1/20-Fe: no significant BTB but continues to have mood symptoms, unclear if there is a  "hormonal component to the mood symptoms    CORTEZ:  April 2023 - CBC, CMP, TSH results from April 2023   Oct 2023 - neg PCO labs with just mild insulin resistance   Polycystic follicles seen in Feb 2024 US    Menarche: 16 yo  Menses: q4-6 weeks lasting 5 days each, not too heavy  Acne: yes (face,chest, back)  Hirsutism: no  Hair loss: yes  Sexual Activity:  men, monogamous but may be breaking up     Medical Problems         Problem List         Anxiety and depression     Overview Addendum 2/5/2024  1:04 PM by Mya Watts MD       Psychiatrist = Everardo Tran ([facility])  Also has therapist through [facility], just saw her 2 days ago  On Lexapro 10mg daily + Buspar 15mg BID. No longer on Wellbutrin.  Low libido            Attention deficit disorder of childhood     History of alcohol dependence (CMS/HCC)     Overview Addendum 2/5/2024  1:08 PM by Mya Watts MD       Hx heavy, daily use and binge drinking  Saw psychologist starting in 2020               SoHx:  Starting new job and will get off [employer].    Just moved in with her boyfriend  May be breaking up    Objective   /80   Pulse 67   Ht 1.6 m (5' 3\")   Wt 57.2 kg (126 lb)   LMP 08/19/2024   BMI 22.32 kg/m²      General:   Alert and oriented, in no acute distress   Skin: No significant acne. No hirsutism.   Neck: Supple. No visible thyromegaly.    Psych Normal affect. Normal mood.        "

## 2024-09-22 DIAGNOSIS — F32.A ANXIETY AND DEPRESSION: Primary | ICD-10-CM

## 2024-09-22 DIAGNOSIS — F41.9 ANXIETY AND DEPRESSION: Primary | ICD-10-CM

## 2024-09-23 RX ORDER — ESCITALOPRAM OXALATE 20 MG/1
20 TABLET ORAL DAILY
Qty: 30 TABLET | Refills: 0 | Status: SHIPPED | OUTPATIENT
Start: 2024-09-23

## 2024-09-26 ENCOUNTER — APPOINTMENT (OUTPATIENT)
Dept: PRIMARY CARE | Facility: CLINIC | Age: 29
End: 2024-09-26
Payer: COMMERCIAL

## 2024-09-26 VITALS
TEMPERATURE: 97.4 F | HEART RATE: 64 BPM | OXYGEN SATURATION: 96 % | WEIGHT: 124 LBS | BODY MASS INDEX: 21.97 KG/M2 | HEIGHT: 63 IN | RESPIRATION RATE: 16 BRPM | DIASTOLIC BLOOD PRESSURE: 60 MMHG | SYSTOLIC BLOOD PRESSURE: 100 MMHG

## 2024-09-26 DIAGNOSIS — E55.9 VITAMIN D DEFICIENCY: ICD-10-CM

## 2024-09-26 DIAGNOSIS — Z00.00 ANNUAL PHYSICAL EXAM: Primary | ICD-10-CM

## 2024-09-26 DIAGNOSIS — D50.8 OTHER IRON DEFICIENCY ANEMIA: ICD-10-CM

## 2024-09-26 DIAGNOSIS — E28.2 PCOS (POLYCYSTIC OVARIAN SYNDROME): ICD-10-CM

## 2024-09-26 DIAGNOSIS — F31.81 BIPOLAR II DISORDER (MULTI): ICD-10-CM

## 2024-09-26 PROBLEM — G44.209 MUSCLE CONTRACTION HEADACHE: Status: ACTIVE | Noted: 2023-04-11

## 2024-09-26 PROBLEM — K52.9 COLITIS: Status: ACTIVE | Noted: 2023-04-11

## 2024-09-26 PROBLEM — R10.13 EPIGASTRIC PAIN: Status: ACTIVE | Noted: 2023-04-11

## 2024-09-26 PROBLEM — R09.81 NASAL CONGESTION: Status: RESOLVED | Noted: 2024-09-26 | Resolved: 2024-09-26

## 2024-09-26 PROBLEM — D64.9 ANEMIA: Status: ACTIVE | Noted: 2023-09-07

## 2024-09-26 PROBLEM — K29.70 GASTRITIS: Status: ACTIVE | Noted: 2023-04-11

## 2024-09-26 PROBLEM — F10.21 HISTORY OF ALCOHOL DEPENDENCE (MULTI): Status: RESOLVED | Noted: 2023-04-11 | Resolved: 2024-09-26

## 2024-09-26 PROBLEM — E83.52 HYPERCALCEMIA: Status: ACTIVE | Noted: 2023-04-11

## 2024-09-26 PROBLEM — K59.00 CONSTIPATION: Status: ACTIVE | Noted: 2023-04-11

## 2024-09-26 PROBLEM — H66.90 OTITIS MEDIA: Status: ACTIVE | Noted: 2024-09-26

## 2024-09-26 PROBLEM — R09.81 NASAL CONGESTION: Status: ACTIVE | Noted: 2024-09-26

## 2024-09-26 PROBLEM — R11.0 NAUSEA: Status: ACTIVE | Noted: 2023-04-11

## 2024-09-26 PROBLEM — L65.9 LOSS OF HAIR: Status: ACTIVE | Noted: 2023-04-11

## 2024-09-26 PROBLEM — R07.0 THROAT PAIN: Status: RESOLVED | Noted: 2017-07-31 | Resolved: 2024-09-26

## 2024-09-26 PROBLEM — R07.0 THROAT PAIN: Status: ACTIVE | Noted: 2017-07-31

## 2024-09-26 PROBLEM — D50.9 IRON DEFICIENCY ANEMIA: Status: ACTIVE | Noted: 2023-04-11

## 2024-09-26 PROBLEM — R53.83 FATIGUE: Status: ACTIVE | Noted: 2023-09-07

## 2024-09-26 PROBLEM — J02.0 PHARYNGITIS DUE TO STREPTOCOCCUS SPECIES: Status: RESOLVED | Noted: 2017-07-31 | Resolved: 2024-09-26

## 2024-09-26 PROBLEM — R63.4 ABNORMAL WEIGHT LOSS: Status: ACTIVE | Noted: 2023-04-11

## 2024-09-26 PROBLEM — R11.0 NAUSEA: Status: RESOLVED | Noted: 2023-04-11 | Resolved: 2024-09-26

## 2024-09-26 PROBLEM — R07.89 CHEST DISCOMFORT: Status: ACTIVE | Noted: 2023-04-11

## 2024-09-26 PROBLEM — J02.0 PHARYNGITIS DUE TO STREPTOCOCCUS SPECIES: Status: ACTIVE | Noted: 2017-07-31

## 2024-09-26 PROCEDURE — 99395 PREV VISIT EST AGE 18-39: CPT | Performed by: INTERNAL MEDICINE

## 2024-09-26 PROCEDURE — 1036F TOBACCO NON-USER: CPT | Performed by: INTERNAL MEDICINE

## 2024-09-26 PROCEDURE — 3008F BODY MASS INDEX DOCD: CPT | Performed by: INTERNAL MEDICINE

## 2024-09-26 ASSESSMENT — PATIENT HEALTH QUESTIONNAIRE - PHQ9
1. LITTLE INTEREST OR PLEASURE IN DOING THINGS: NOT AT ALL
SUM OF ALL RESPONSES TO PHQ9 QUESTIONS 1 AND 2: 0
2. FEELING DOWN, DEPRESSED OR HOPELESS: NOT AT ALL

## 2024-09-26 ASSESSMENT — ENCOUNTER SYMPTOMS
CHEST TIGHTNESS: 0
DIARRHEA: 0
ABDOMINAL PAIN: 0
VOMITING: 0
COUGH: 0
ADENOPATHY: 0
PALPITATIONS: 0
SORE THROAT: 0
SHORTNESS OF BREATH: 0
HEADACHES: 1
CHILLS: 0
UNEXPECTED WEIGHT CHANGE: 0
WEAKNESS: 0
ROS GI COMMENTS: SOMETIMES
ARTHRALGIAS: 0
FATIGUE: 1
DYSURIA: 0
CONSTIPATION: 0
SLEEP DISTURBANCE: 0
NERVOUS/ANXIOUS: 1
NAUSEA: 1
JOINT SWELLING: 0
WHEEZING: 0
CONFUSION: 0
DIZZINESS: 0

## 2024-09-26 NOTE — PATIENT INSTRUCTIONS
Was nice seeing you today.  Continue same medication.  Have lab work done before next appointment if labs were ordered today.  Fu in 1 month.  Call/ contact our office with any concerns.    If you have labs or test done and you can't see the report in your chart or you didn't hear from us in 2 weeks after test/labs done , please, call our office for reports.  Please , do not assume that they were normal.    Any test results  and questions you might have , will be discussed at next visit -- please make sure to make a follow up appt after testing if reports are abnormal or you have questions.

## 2024-09-26 NOTE — ASSESSMENT & PLAN NOTE
Condition stable, controlled with current medication, no medication side effects, continue same treatment,call if any  new symptoms develops. Follow up with specialty service as needed or advised.

## 2024-09-26 NOTE — PROGRESS NOTES
Lisa Macedo is a 29 y.o. female who presents for Annual Exam (CPE).  CPE  Never a smoker    Colonoscopy - 12.18.2019- to repeat at 46 y old  Xray spine-9.2023   2 moles - upper back  x years ,got bigger , pain and itching, no discharge       Review of Systems   Constitutional:  Positive for fatigue. Negative for chills and unexpected weight change.        Comment   HENT:  Negative for congestion, ear pain and sore throat.    Respiratory:  Negative for cough, chest tightness, shortness of breath and wheezing.    Cardiovascular:  Negative for palpitations and leg swelling.   Gastrointestinal:  Positive for nausea. Negative for abdominal pain, constipation, diarrhea and vomiting.        Sometimes   Genitourinary:  Negative for dysuria and urgency.   Musculoskeletal:  Negative for arthralgias and joint swelling.   Skin:  Negative for rash.   Neurological:  Positive for headaches. Negative for dizziness and weakness.        Sometimes   Hematological:  Negative for adenopathy.   Psychiatric/Behavioral:  Negative for confusion and sleep disturbance. The patient is nervous/anxious.    All other systems reviewed and are negative.      Objective   Physical Exam  Constitutional:       Appearance: Normal appearance.   HENT:      Head: Normocephalic and atraumatic.   Eyes:      Conjunctiva/sclera: Conjunctivae normal.   Neck:      Vascular: No carotid bruit.   Cardiovascular:      Rate and Rhythm: Normal rate and regular rhythm.      Heart sounds: No murmur heard.  Pulmonary:      Effort: No respiratory distress.      Breath sounds: No wheezing, rhonchi or rales.   Chest:      Chest wall: No tenderness.   Abdominal:      General: Bowel sounds are normal. There is no distension.      Palpations: Abdomen is soft. There is no mass.      Tenderness: There is no abdominal tenderness.   Musculoskeletal:         General: Normal range of motion.      Cervical back: Neck supple.   Lymphadenopathy:      Cervical: No cervical  "adenopathy.   Skin:     Coloration: Skin is not jaundiced.      Findings: No rash.   Neurological:      General: No focal deficit present.      Mental Status: She is alert and oriented to person, place, and time. Mental status is at baseline.      Motor: No weakness.      Gait: Gait normal.   Psychiatric:         Mood and Affect: Mood normal.         Behavior: Behavior normal.         Judgment: Judgment normal.       /60 (BP Location: Left arm, Patient Position: Sitting)   Pulse 64   Temp 36.3 °C (97.4 °F)   Resp 16   Ht 1.6 m (5' 3\")   Wt 56.2 kg (124 lb)   LMP 09/19/2024 Comment: on birth control  SpO2 96%   BMI 21.97 kg/m²       Assessment/Plan   Problem List Items Addressed This Visit       PCOS (polycystic ovarian syndrome)    Anemia    Bipolar II disorder (Multi)     Condition stable, controlled with current medication, no medication side effects, continue same treatment,call if any  new symptoms develops. Follow up with specialty service as needed or advised.           Annual physical exam - Primary         "

## 2024-10-09 ENCOUNTER — LAB (OUTPATIENT)
Dept: LAB | Facility: LAB | Age: 29
End: 2024-10-09
Payer: MEDICARE

## 2024-10-09 DIAGNOSIS — E55.9 VITAMIN D DEFICIENCY: ICD-10-CM

## 2024-10-09 DIAGNOSIS — Z00.00 ANNUAL PHYSICAL EXAM: ICD-10-CM

## 2024-10-09 LAB
25(OH)D3 SERPL-MCNC: 44 NG/ML (ref 30–100)
ALBUMIN SERPL BCP-MCNC: 3.9 G/DL (ref 3.4–5)
ALP SERPL-CCNC: 41 U/L (ref 33–110)
ALT SERPL W P-5'-P-CCNC: 11 U/L (ref 7–45)
ANION GAP SERPL CALC-SCNC: 14 MMOL/L (ref 10–20)
APPEARANCE UR: CLEAR
AST SERPL W P-5'-P-CCNC: 16 U/L (ref 9–39)
BASOPHILS # BLD AUTO: 0.03 X10*3/UL (ref 0–0.1)
BASOPHILS NFR BLD AUTO: 0.6 %
BILIRUB SERPL-MCNC: 0.7 MG/DL (ref 0–1.2)
BILIRUB UR STRIP.AUTO-MCNC: NEGATIVE MG/DL
BUN SERPL-MCNC: 9 MG/DL (ref 6–23)
CALCIUM SERPL-MCNC: 9 MG/DL (ref 8.6–10.6)
CHLORIDE SERPL-SCNC: 103 MMOL/L (ref 98–107)
CHOLEST SERPL-MCNC: 150 MG/DL (ref 0–199)
CHOLESTEROL/HDL RATIO: 3.1
CO2 SERPL-SCNC: 24 MMOL/L (ref 21–32)
COLOR UR: YELLOW
CREAT SERPL-MCNC: 0.7 MG/DL (ref 0.5–1.05)
EGFRCR SERPLBLD CKD-EPI 2021: >90 ML/MIN/1.73M*2
EOSINOPHIL # BLD AUTO: 0.07 X10*3/UL (ref 0–0.7)
EOSINOPHIL NFR BLD AUTO: 1.4 %
ERYTHROCYTE [DISTWIDTH] IN BLOOD BY AUTOMATED COUNT: 12.8 % (ref 11.5–14.5)
GLUCOSE SERPL-MCNC: 78 MG/DL (ref 74–99)
GLUCOSE UR STRIP.AUTO-MCNC: NORMAL MG/DL
HCT VFR BLD AUTO: 39.4 % (ref 36–46)
HCV AB SER QL: NONREACTIVE
HDLC SERPL-MCNC: 48.1 MG/DL
HGB BLD-MCNC: 13.4 G/DL (ref 12–16)
IMM GRANULOCYTES # BLD AUTO: 0.01 X10*3/UL (ref 0–0.7)
IMM GRANULOCYTES NFR BLD AUTO: 0.2 % (ref 0–0.9)
KETONES UR STRIP.AUTO-MCNC: ABNORMAL MG/DL
LDLC SERPL CALC-MCNC: 77 MG/DL
LEUKOCYTE ESTERASE UR QL STRIP.AUTO: NEGATIVE
LYMPHOCYTES # BLD AUTO: 1.77 X10*3/UL (ref 1.2–4.8)
LYMPHOCYTES NFR BLD AUTO: 34.3 %
MAGNESIUM SERPL-MCNC: 1.9 MG/DL (ref 1.6–2.4)
MCH RBC QN AUTO: 32.2 PG (ref 26–34)
MCHC RBC AUTO-ENTMCNC: 34 G/DL (ref 32–36)
MCV RBC AUTO: 95 FL (ref 80–100)
MONOCYTES # BLD AUTO: 0.38 X10*3/UL (ref 0.1–1)
MONOCYTES NFR BLD AUTO: 7.4 %
NEUTROPHILS # BLD AUTO: 2.9 X10*3/UL (ref 1.2–7.7)
NEUTROPHILS NFR BLD AUTO: 56.1 %
NITRITE UR QL STRIP.AUTO: NEGATIVE
NON HDL CHOLESTEROL: 102 MG/DL (ref 0–149)
NRBC BLD-RTO: 0 /100 WBCS (ref 0–0)
PH UR STRIP.AUTO: 6.5 [PH]
PLATELET # BLD AUTO: 370 X10*3/UL (ref 150–450)
POTASSIUM SERPL-SCNC: 3.9 MMOL/L (ref 3.5–5.3)
PROT SERPL-MCNC: 6.5 G/DL (ref 6.4–8.2)
PROT UR STRIP.AUTO-MCNC: NEGATIVE MG/DL
RBC # BLD AUTO: 4.16 X10*6/UL (ref 4–5.2)
RBC # UR STRIP.AUTO: NEGATIVE /UL
SODIUM SERPL-SCNC: 137 MMOL/L (ref 136–145)
SP GR UR STRIP.AUTO: 1.02
TRIGL SERPL-MCNC: 127 MG/DL (ref 0–149)
TSH SERPL-ACNC: 0.64 MIU/L (ref 0.44–3.98)
UROBILINOGEN UR STRIP.AUTO-MCNC: NORMAL MG/DL
VIT B12 SERPL-MCNC: 264 PG/ML (ref 211–911)
VLDL: 25 MG/DL (ref 0–40)
WBC # BLD AUTO: 5.2 X10*3/UL (ref 4.4–11.3)

## 2024-10-09 PROCEDURE — 84443 ASSAY THYROID STIM HORMONE: CPT

## 2024-10-09 PROCEDURE — 82306 VITAMIN D 25 HYDROXY: CPT

## 2024-10-09 PROCEDURE — 80061 LIPID PANEL: CPT

## 2024-10-09 PROCEDURE — 83735 ASSAY OF MAGNESIUM: CPT

## 2024-10-09 PROCEDURE — 36415 COLL VENOUS BLD VENIPUNCTURE: CPT

## 2024-10-09 PROCEDURE — 81003 URINALYSIS AUTO W/O SCOPE: CPT

## 2024-10-09 PROCEDURE — 82607 VITAMIN B-12: CPT

## 2024-10-09 PROCEDURE — 86803 HEPATITIS C AB TEST: CPT

## 2024-10-09 PROCEDURE — 80053 COMPREHEN METABOLIC PANEL: CPT

## 2024-10-09 PROCEDURE — 85025 COMPLETE CBC W/AUTO DIFF WBC: CPT

## 2024-11-04 ENCOUNTER — APPOINTMENT (OUTPATIENT)
Dept: OBSTETRICS AND GYNECOLOGY | Facility: CLINIC | Age: 29
End: 2024-11-04
Payer: COMMERCIAL

## 2024-11-07 ENCOUNTER — OFFICE VISIT (OUTPATIENT)
Dept: OBSTETRICS AND GYNECOLOGY | Facility: CLINIC | Age: 29
End: 2024-11-07
Payer: COMMERCIAL

## 2024-11-07 VITALS
WEIGHT: 126 LBS | BODY MASS INDEX: 22.32 KG/M2 | DIASTOLIC BLOOD PRESSURE: 85 MMHG | HEART RATE: 78 BPM | SYSTOLIC BLOOD PRESSURE: 119 MMHG | HEIGHT: 63 IN

## 2024-11-07 DIAGNOSIS — M54.50 LOW BACK PAIN, UNSPECIFIED BACK PAIN LATERALITY, UNSPECIFIED CHRONICITY, UNSPECIFIED WHETHER SCIATICA PRESENT: Primary | ICD-10-CM

## 2024-11-07 DIAGNOSIS — R10.32 GROIN PAIN, LEFT: ICD-10-CM

## 2024-11-07 ASSESSMENT — ENCOUNTER SYMPTOMS
ALLERGIC/IMMUNOLOGIC NEGATIVE: 0
CONSTITUTIONAL NEGATIVE: 0
MUSCULOSKELETAL NEGATIVE: 0
NEUROLOGICAL NEGATIVE: 0
HEMATOLOGIC/LYMPHATIC NEGATIVE: 0
EYES NEGATIVE: 0
CARDIOVASCULAR NEGATIVE: 0
ENDOCRINE NEGATIVE: 0
GASTROINTESTINAL NEGATIVE: 0
RESPIRATORY NEGATIVE: 0
PSYCHIATRIC NEGATIVE: 0

## 2024-11-07 ASSESSMENT — PATIENT HEALTH QUESTIONNAIRE - PHQ9
1. LITTLE INTEREST OR PLEASURE IN DOING THINGS: NOT AT ALL
2. FEELING DOWN, DEPRESSED OR HOPELESS: NOT AT ALL
SUM OF ALL RESPONSES TO PHQ9 QUESTIONS 1 AND 2: 0

## 2024-11-07 ASSESSMENT — PAIN SCALES - GENERAL: PAINLEVEL_OUTOF10: 2

## 2024-11-07 NOTE — PROGRESS NOTES
Subjective     Patient ID: Maritza Macedo is a 29 y.o. G0  with suspected PCOS and emotion lability now well controlled with OCPs and lexapro, following with psychiatrist.   Pt presents with concerns of a new L lower pelvic pain x 2 weeks and to discuss fertility in the s/o pcos.  She describes the lower pelvic pain as an intermittent sharp pain ranging 2/10-6/10. It occasionally radiates to her back and is worse with movements, walking, and sometime stretching. She mentions she believes she may have an enlarged lymph node in her groin and is concerned of a pelvic infection.   She mentions occasional burning and pressure with urination. Also mentions back pain with burning and tingling along the left thigh. Has tried tylenol without relief  Denies new vaginal odor, irritation, or discharge. Pt has low concern for STI at this time. States she's been with the same partner x 1.5yr. Last testing in 2023.    LMP: 0ct 14  Pap: 2022- wnl    Objective   Physical Exam    BP: 119/85 , HR: 78  General: no acute distress  HEENT: normocephalic, atraumatic  Heart: warm and well perfused  Lungs: breathing comfortably on room air  Abdomen: non-distended, non-tender to palpation in bilateral lower quadrants. Tender to palpation in the L groin/inguinal area  Extremities: moving all extremities  Neuro: awake and conversant  Psych: appropriate mood and affect     Assessment/Plan   Low back pain  Groin pain, left  Pt with pain specifically in the L groin area, worsened with specific movement. Has associated back pain and burning and tingling across the L thigh. Was a pole vaulter in highschool. Has had back issues since. Sees a chiropractor but not improving symptoms.  Symptoms appear MSK in nature considering lack of gyn symptoms. Recent neg U/A 11/2, low concern for UTI in s/o reported discomfort with urination, declines STI testing  -recommend referral to sports medicine for further eval. Referral placed  -recommend hot packs, yoga,  stretching, low intensity exercise and ibuprofen vs tylenol for pain    PCOS  -not interested in trying for pregnancy at this time. Desires preg in next 5 yrs.  -discussed staying on OCPs to maintain appropriate symptom control  -explained PCOS is a spectrum and difficult  to predict severity of fertility issues. Some individuals have no issues some require simple intervention  -Pt understands all discussed. Recommend returning to office when actively considering preg for further preconception counseling    *flu vaccine administered today    RTC PRN or for annual    Pt seen & d/w Dr. Ryland Barfield MD  OBGYN, PGY-1

## 2024-11-07 NOTE — PROGRESS NOTES
Flu vaccine per (provider)  Given IM into left deltoid  Supplied by office  Patient tolerated well  VIS given     LOT #: [id number]  Expiration date: 6/30/2025

## 2024-11-12 NOTE — PROGRESS NOTES
I saw and evaluated the patient. I personally obtained the key and critical portions of the history and physical exam or was physically present for key and critical portions performed by the resident/fellow. I reviewed the resident/fellow's documentation and discussed the patient with the resident/fellow. I agree with the resident/fellow's medical decision making as documented in the note.    Laura Marcelino MD

## 2024-11-18 ENCOUNTER — APPOINTMENT (OUTPATIENT)
Dept: PRIMARY CARE | Facility: CLINIC | Age: 29
End: 2024-11-18
Payer: COMMERCIAL

## 2024-11-18 VITALS
OXYGEN SATURATION: 97 % | WEIGHT: 126.2 LBS | HEART RATE: 88 BPM | DIASTOLIC BLOOD PRESSURE: 64 MMHG | RESPIRATION RATE: 16 BRPM | TEMPERATURE: 97.2 F | SYSTOLIC BLOOD PRESSURE: 110 MMHG | BODY MASS INDEX: 22.36 KG/M2 | HEIGHT: 63 IN

## 2024-11-18 DIAGNOSIS — B02.9 HERPES ZOSTER WITHOUT COMPLICATION: ICD-10-CM

## 2024-11-18 DIAGNOSIS — R59.0 INGUINAL LYMPHADENOPATHY: Primary | ICD-10-CM

## 2024-11-18 PROCEDURE — 3008F BODY MASS INDEX DOCD: CPT | Performed by: INTERNAL MEDICINE

## 2024-11-18 PROCEDURE — 99214 OFFICE O/P EST MOD 30 MIN: CPT | Performed by: INTERNAL MEDICINE

## 2024-11-18 PROCEDURE — 1036F TOBACCO NON-USER: CPT | Performed by: INTERNAL MEDICINE

## 2024-11-18 RX ORDER — ARIPIPRAZOLE 2 MG/1
2 TABLET ORAL NIGHTLY
COMMUNITY
Start: 2024-11-01

## 2024-11-18 ASSESSMENT — ENCOUNTER SYMPTOMS
DIZZINESS: 0
ADENOPATHY: 0
CONFUSION: 0
WEAKNESS: 0
CHEST TIGHTNESS: 0
WHEEZING: 0
JOINT SWELLING: 0
CHILLS: 0
ABDOMINAL PAIN: 0
FATIGUE: 0
ARTHRALGIAS: 0
UNEXPECTED WEIGHT CHANGE: 0
PALPITATIONS: 0
CONSTIPATION: 0
SHORTNESS OF BREATH: 0
DYSURIA: 0
NAUSEA: 0
DIARRHEA: 0
COUGH: 0
VOMITING: 0
SORE THROAT: 0
SLEEP DISTURBANCE: 0

## 2024-11-18 ASSESSMENT — PATIENT HEALTH QUESTIONNAIRE - PHQ9: 1. LITTLE INTEREST OR PLEASURE IN DOING THINGS: NOT AT ALL

## 2024-11-18 NOTE — PROGRESS NOTES
Subjective   Maritza Macedo is a 29 y.o. female who presents for Mass (Lump L  groin area  x 1 month ).  Lump on the  L  groin area x 1 month possible same size or just little smaller ,at the begging patient had a cold   was taken ATB abd did not get any smaller in size , sensitive to pressure   Had  3 dots upper  L  thigh ,  and one lower on the thigh, had pain too along the thigh , not sure if there are related , not itching   Labs and test reviewed with patient , all question answered, further evaluation, if needed , discussed.          Review of Systems   Constitutional:  Negative for chills, fatigue and unexpected weight change.        Comment   HENT:  Negative for congestion, ear pain and sore throat.    Respiratory:  Negative for cough, chest tightness, shortness of breath and wheezing.    Cardiovascular:  Negative for palpitations and leg swelling.   Gastrointestinal:  Negative for abdominal pain, constipation, diarrhea, nausea and vomiting.   Genitourinary:  Negative for dysuria and urgency.   Musculoskeletal:  Negative for arthralgias and joint swelling.   Skin:  Negative for rash.   Neurological:  Negative for dizziness and weakness.   Hematological:  Negative for adenopathy.   Psychiatric/Behavioral:  Negative for confusion and sleep disturbance.        Objective   Physical Exam  Constitutional:       Appearance: Normal appearance.      Comments: Has 3 cluster dots on upper tight and one lower, looks like possible shingles,  1cm/1 cm inguinal lymph node,mobile    HENT:      Head: Normocephalic and atraumatic.   Eyes:      Pupils: Pupils are equal, round, and reactive to light.   Cardiovascular:      Rate and Rhythm: Normal rate and regular rhythm.   Pulmonary:      Effort: Pulmonary effort is normal.      Breath sounds: Normal breath sounds.   Musculoskeletal:         General: Normal range of motion.      Cervical back: Normal range of motion and neck supple.   Skin:     General: Skin is warm.  "  Neurological:      General: No focal deficit present.      Mental Status: She is alert and oriented to person, place, and time.   Psychiatric:         Mood and Affect: Mood normal.         Behavior: Behavior normal.       /64 (BP Location: Right arm, Patient Position: Sitting)   Pulse 88   Temp 36.2 °C (97.2 °F)   Resp 16   Ht 1.6 m (5' 3\")   Wt 57.2 kg (126 lb 3.2 oz)   LMP 11/10/2024 (Exact Date)   SpO2 97%   BMI 22.36 kg/m²       Assessment/Plan   Problem List Items Addressed This Visit       Inguinal lymphadenopathy - Primary     Possible sec to shinghels right thigh. Monitor for now.  Fu if not gone or small er in 1 month         Herpes zoster without complication         "

## 2025-04-29 DIAGNOSIS — L70.0 ACNE VULGARIS: ICD-10-CM

## 2025-04-29 DIAGNOSIS — F32.81 PMDD (PREMENSTRUAL DYSPHORIC DISORDER): ICD-10-CM

## 2025-07-22 DIAGNOSIS — L70.0 ACNE VULGARIS: ICD-10-CM

## 2025-07-22 DIAGNOSIS — F32.81 PMDD (PREMENSTRUAL DYSPHORIC DISORDER): ICD-10-CM

## 2025-09-18 ENCOUNTER — APPOINTMENT (OUTPATIENT)
Dept: PRIMARY CARE | Facility: CLINIC | Age: 30
End: 2025-09-18
Payer: MEDICARE